# Patient Record
Sex: FEMALE | Race: WHITE | HISPANIC OR LATINO | Employment: FULL TIME | URBAN - METROPOLITAN AREA
[De-identification: names, ages, dates, MRNs, and addresses within clinical notes are randomized per-mention and may not be internally consistent; named-entity substitution may affect disease eponyms.]

---

## 2020-06-29 NOTE — H&P
DATE OF SCHEDULED SURGERY:  2020    HISTORY OF PRESENT ILLNESS:  This is a 49-year-old  4, para 4-0-0-4,   here for preop appointment.  Patient scheduled for total laparoscopic   hysterectomy with bilateral salpingectomy.  Patient has history of heavy   periods going on for years.  Has tried IUD and birth control pills, has not   worked and here requesting definitive relief from bleeding.  Patient is not   currently sexually active.    OBSTETRIC HISTORY:   4, para 4, 4 living children, all normal vaginal   delivery.    PAST MEDICAL HISTORY:  Denies high blood pressure, diabetes or thyroid issues.    MEDICATIONS:  She is not taking any medications.    PAST SURGICAL HISTORY:  No prior surgeries.    SOCIAL HISTORY:  Denies smoking or alcohol use.    FAMILY HISTORY:  Nothing contributory.    REVIEW OF SYSTEMS:  Patient is alert and oriented.  Denies chest pain,   palpitation, or shortness of breath.  Appetite is good.  Regular bowel and   bladder habits.  She is currently taking iron pills.    PHYSICAL EXAMINATION:  VITAL SIGNS:  Stable.  See EMR for current vitals.  Patient is 5 feet 2 inches   tall and weighs 155 pounds.  GENERAL:  Patient is awake, alert, well developed, well nourished and well   groomed.  RESPIRATORY:  Patient is relaxed and breathes without effort.  LUNGS:  Clear to auscultate bilaterally.  No crackles, wheezes, rhonchi or   stridor.  CARDIOVASCULAR:  Rate is normal, rhythm is regular.  S1, S2 are normal.  No   murmurs, no gallops.  ABDOMEN:  Soft, nontender.  No guarding or rigidity.  Bowel sounds are normal.    No palpable masses, no hepatosplenomegaly, no costovertebral angle   tenderness.    IMPRESSION:  A 49-year-old  4, para 4-0-0-4, with abnormal uterine   bleeding.  Ultrasound suggestive of small fibroids.  Endometrial biopsy was   benign, proliferative with polypoid type of endometrial lining.  Patient has   tried medical management and has failed and is desirous  of definitive therapy   for which patient is scheduled for total laparoscopic hysterectomy with   bilateral salpingectomy, possible laparotomy, possible cystoscopy was   discussed.  Preoperative instructions were given.  Operative procedure   discussed in detail.  Risk inclusive of but not limited to infection, injury   and bleeding was explained.  Postoperative instructions were also given.    Patient understands and wants to go ahead with the procedure.   was   ___.             ____________________________________     Alice R. Apgar, PA-C ARA / MIO    DD:  2020 10:58:33  DT:  2020 11:25:08    D#:  [id number]  Job#:  [id number]

## 2020-06-30 NOTE — H&P
DATE OF SCHEDULED SURGERY:  2020    HISTORY OF PRESENT ILLNESS:  This is a 49-year-old  4, para 4-0-0-4,   here for preop appointment.  Patient scheduled for total laparoscopic   hysterectomy with bilateral salpingectomy.  Patient has history of heavy   periods going on for years.  Has tried IUD and birth control pills, has not   worked and here requesting definitive relief from bleeding.  Patient is not   currently sexually active.    OBSTETRIC HISTORY:   4, para 4, 4 living children, all normal vaginal   delivery.    PAST MEDICAL HISTORY:  Denies high blood pressure, diabetes or thyroid issues.    MEDICATIONS:  She is not taking any medications.    PAST SURGICAL HISTORY:  No prior surgeries.    SOCIAL HISTORY:  Denies smoking or alcohol use.    FAMILY HISTORY:  Nothing contributory.    REVIEW OF SYSTEMS:  Patient is alert and oriented.  Denies chest pain,   palpitation, or shortness of breath.  Appetite is good.  Regular bowel and   bladder habits.  She is currently taking iron pills.    PHYSICAL EXAMINATION:  VITAL SIGNS:  Stable.  See EMR for current vitals.  Patient is 5 feet 2 inches   tall and weighs 155 pounds.  GENERAL:  Patient is awake, alert, well developed, well nourished and well   groomed.  RESPIRATORY:  Patient is relaxed and breathes without effort.  LUNGS:  Clear to auscultate bilaterally.  No crackles, wheezes, rhonchi or   stridor.  CARDIOVASCULAR:  Rate is normal, rhythm is regular.  S1, S2 are normal.  No   murmurs, no gallops.  ABDOMEN:  Soft, nontender.  No guarding or rigidity.  Bowel sounds are normal.    No palpable masses, no hepatosplenomegaly, no costovertebral angle   tenderness.    IMPRESSION:  A 49-year-old  4, para 4-0-0-4, with abnormal uterine   bleeding.  Ultrasound suggestive of small fibroids.  Endometrial biopsy was   benign, proliferative with polypoid type of endometrial lining.  Patient has   tried medical management and has failed and is desirous  of definitive therapy   for which patient is scheduled for total laparoscopic hysterectomy with   bilateral salpingectomy, possible laparotomy, possible cystoscopy was   discussed.  Preoperative instructions were given.  Operative procedure   discussed in detail.  Risk inclusive of but not limited to infection, injury   and bleeding was explained.  Postoperative instructions were also given.    Patient understands and wants to go ahead with the procedure.   was   ___.             ____________________________________     MD ARIANNA Jamil / MIO    DD:  06/29/2020 10:58:33  DT:  06/29/2020 11:25:08    D#:  [id number]  Job#:  [id number]

## 2020-07-10 ENCOUNTER — OFFICE VISIT (OUTPATIENT)
Dept: ADMISSIONS | Facility: MEDICAL CENTER | Age: 50
End: 2020-07-10
Attending: OBSTETRICS & GYNECOLOGY
Payer: COMMERCIAL

## 2020-07-10 DIAGNOSIS — Z01.812 PRE-OPERATIVE LABORATORY EXAMINATION: ICD-10-CM

## 2020-07-10 DIAGNOSIS — Z01.810 PRE-OPERATIVE CARDIOVASCULAR EXAMINATION: ICD-10-CM

## 2020-07-10 LAB
ANION GAP SERPL CALC-SCNC: 9 MMOL/L (ref 7–16)
APPEARANCE UR: CLEAR
BASOPHILS # BLD AUTO: 0.7 % (ref 0–1.8)
BASOPHILS # BLD: 0.04 K/UL (ref 0–0.12)
BILIRUB UR QL STRIP.AUTO: NEGATIVE
BUN SERPL-MCNC: 9 MG/DL (ref 8–22)
CALCIUM SERPL-MCNC: 8.6 MG/DL (ref 8.5–10.5)
CHLORIDE SERPL-SCNC: 105 MMOL/L (ref 96–112)
CO2 SERPL-SCNC: 23 MMOL/L (ref 20–33)
COLOR UR: YELLOW
COVID ORDER STATUS COVID19: NORMAL
CREAT SERPL-MCNC: 0.49 MG/DL (ref 0.5–1.4)
EKG IMPRESSION: NORMAL
EOSINOPHIL # BLD AUTO: 0.08 K/UL (ref 0–0.51)
EOSINOPHIL NFR BLD: 1.3 % (ref 0–6.9)
ERYTHROCYTE [DISTWIDTH] IN BLOOD BY AUTOMATED COUNT: 40 FL (ref 35.9–50)
GLUCOSE SERPL-MCNC: 88 MG/DL (ref 65–99)
GLUCOSE UR STRIP.AUTO-MCNC: NEGATIVE MG/DL
HCT VFR BLD AUTO: 40.9 % (ref 37–47)
HGB BLD-MCNC: 13.8 G/DL (ref 12–16)
IMM GRANULOCYTES # BLD AUTO: 0.03 K/UL (ref 0–0.11)
IMM GRANULOCYTES NFR BLD AUTO: 0.5 % (ref 0–0.9)
KETONES UR STRIP.AUTO-MCNC: NEGATIVE MG/DL
LEUKOCYTE ESTERASE UR QL STRIP.AUTO: NEGATIVE
LYMPHOCYTES # BLD AUTO: 2.02 K/UL (ref 1–4.8)
LYMPHOCYTES NFR BLD: 33.5 % (ref 22–41)
MCH RBC QN AUTO: 29.9 PG (ref 27–33)
MCHC RBC AUTO-ENTMCNC: 33.7 G/DL (ref 33.6–35)
MCV RBC AUTO: 88.5 FL (ref 81.4–97.8)
MICRO URNS: NORMAL
MONOCYTES # BLD AUTO: 0.35 K/UL (ref 0–0.85)
MONOCYTES NFR BLD AUTO: 5.8 % (ref 0–13.4)
NEUTROPHILS # BLD AUTO: 3.51 K/UL (ref 2–7.15)
NEUTROPHILS NFR BLD: 58.2 % (ref 44–72)
NITRITE UR QL STRIP.AUTO: NEGATIVE
NRBC # BLD AUTO: 0 K/UL
NRBC BLD-RTO: 0 /100 WBC
PH UR STRIP.AUTO: 8 [PH] (ref 5–8)
PLATELET # BLD AUTO: 283 K/UL (ref 164–446)
PMV BLD AUTO: 9.4 FL (ref 9–12.9)
POTASSIUM SERPL-SCNC: 4 MMOL/L (ref 3.6–5.5)
PROT UR QL STRIP: NEGATIVE MG/DL
RBC # BLD AUTO: 4.62 M/UL (ref 4.2–5.4)
RBC UR QL AUTO: NEGATIVE
SODIUM SERPL-SCNC: 137 MMOL/L (ref 135–145)
SP GR UR STRIP.AUTO: 1.01
UROBILINOGEN UR STRIP.AUTO-MCNC: 0.2 MG/DL
WBC # BLD AUTO: 6 K/UL (ref 4.8–10.8)

## 2020-07-10 PROCEDURE — U0003 INFECTIOUS AGENT DETECTION BY NUCLEIC ACID (DNA OR RNA); SEVERE ACUTE RESPIRATORY SYNDROME CORONAVIRUS 2 (SARS-COV-2) (CORONAVIRUS DISEASE [COVID-19]), AMPLIFIED PROBE TECHNIQUE, MAKING USE OF HIGH THROUGHPUT TECHNOLOGIES AS DESCRIBED BY CMS-2020-01-R: HCPCS

## 2020-07-10 PROCEDURE — 81003 URINALYSIS AUTO W/O SCOPE: CPT

## 2020-07-10 PROCEDURE — 93010 ELECTROCARDIOGRAM REPORT: CPT | Performed by: INTERNAL MEDICINE

## 2020-07-10 PROCEDURE — 36415 COLL VENOUS BLD VENIPUNCTURE: CPT

## 2020-07-10 PROCEDURE — 93005 ELECTROCARDIOGRAM TRACING: CPT

## 2020-07-10 PROCEDURE — 80048 BASIC METABOLIC PNL TOTAL CA: CPT

## 2020-07-10 PROCEDURE — 85025 COMPLETE CBC W/AUTO DIFF WBC: CPT

## 2020-07-10 RX ORDER — ASCORBIC ACID 500 MG
500 TABLET ORAL DAILY
COMMUNITY

## 2020-07-10 SDOH — HEALTH STABILITY: MENTAL HEALTH: HOW OFTEN DO YOU HAVE A DRINK CONTAINING ALCOHOL?: NEVER

## 2020-07-11 LAB
SARS-COV-2 RNA RESP QL NAA+PROBE: NOTDETECTED
SPECIMEN SOURCE: NORMAL

## 2020-07-14 ENCOUNTER — HOSPITAL ENCOUNTER (OUTPATIENT)
Facility: MEDICAL CENTER | Age: 50
End: 2020-07-14
Attending: OBSTETRICS & GYNECOLOGY | Admitting: OBSTETRICS & GYNECOLOGY
Payer: COMMERCIAL

## 2020-07-14 ENCOUNTER — ANESTHESIA (OUTPATIENT)
Dept: SURGERY | Facility: MEDICAL CENTER | Age: 50
End: 2020-07-14
Payer: COMMERCIAL

## 2020-07-14 ENCOUNTER — ANESTHESIA EVENT (OUTPATIENT)
Dept: SURGERY | Facility: MEDICAL CENTER | Age: 50
End: 2020-07-14
Payer: COMMERCIAL

## 2020-07-14 VITALS
HEART RATE: 81 BPM | RESPIRATION RATE: 12 BRPM | DIASTOLIC BLOOD PRESSURE: 64 MMHG | WEIGHT: 154.1 LBS | OXYGEN SATURATION: 96 % | BODY MASS INDEX: 29.09 KG/M2 | HEIGHT: 61 IN | TEMPERATURE: 97.4 F | SYSTOLIC BLOOD PRESSURE: 104 MMHG

## 2020-07-14 DIAGNOSIS — G89.18 POST-OP PAIN: ICD-10-CM

## 2020-07-14 LAB — HCG UR QL: NEGATIVE

## 2020-07-14 PROCEDURE — 500800 HCHG LAPAROSCOPIC J/L HOOK: Performed by: OBSTETRICS & GYNECOLOGY

## 2020-07-14 PROCEDURE — 500868 HCHG NEEDLE, SURGI(VARES): Performed by: OBSTETRICS & GYNECOLOGY

## 2020-07-14 PROCEDURE — 700111 HCHG RX REV CODE 636 W/ 250 OVERRIDE (IP): Performed by: ANESTHESIOLOGY

## 2020-07-14 PROCEDURE — 160041 HCHG SURGERY MINUTES - EA ADDL 1 MIN LEVEL 4: Performed by: OBSTETRICS & GYNECOLOGY

## 2020-07-14 PROCEDURE — 160009 HCHG ANES TIME/MIN: Performed by: OBSTETRICS & GYNECOLOGY

## 2020-07-14 PROCEDURE — A9270 NON-COVERED ITEM OR SERVICE: HCPCS

## 2020-07-14 PROCEDURE — A6404 STERILE GAUZE > 48 SQ IN: HCPCS | Performed by: OBSTETRICS & GYNECOLOGY

## 2020-07-14 PROCEDURE — 700102 HCHG RX REV CODE 250 W/ 637 OVERRIDE(OP)

## 2020-07-14 PROCEDURE — 501330 HCHG SET, CYSTO IRRIG TUBING: Performed by: OBSTETRICS & GYNECOLOGY

## 2020-07-14 PROCEDURE — A6402 STERILE GAUZE <= 16 SQ IN: HCPCS | Performed by: OBSTETRICS & GYNECOLOGY

## 2020-07-14 PROCEDURE — 81025 URINE PREGNANCY TEST: CPT

## 2020-07-14 PROCEDURE — 500886 HCHG PACK, LAPAROSCOPY: Performed by: OBSTETRICS & GYNECOLOGY

## 2020-07-14 PROCEDURE — 160002 HCHG RECOVERY MINUTES (STAT): Performed by: OBSTETRICS & GYNECOLOGY

## 2020-07-14 PROCEDURE — 700101 HCHG RX REV CODE 250: Performed by: OBSTETRICS & GYNECOLOGY

## 2020-07-14 PROCEDURE — 160029 HCHG SURGERY MINUTES - 1ST 30 MINS LEVEL 4: Performed by: OBSTETRICS & GYNECOLOGY

## 2020-07-14 PROCEDURE — A9270 NON-COVERED ITEM OR SERVICE: HCPCS | Performed by: ANESTHESIOLOGY

## 2020-07-14 PROCEDURE — 501411 HCHG SPONGE, BABY LAP W/O RINGS: Performed by: OBSTETRICS & GYNECOLOGY

## 2020-07-14 PROCEDURE — 160046 HCHG PACU - 1ST 60 MINS PHASE II: Performed by: OBSTETRICS & GYNECOLOGY

## 2020-07-14 PROCEDURE — 700101 HCHG RX REV CODE 250: Performed by: ANESTHESIOLOGY

## 2020-07-14 PROCEDURE — A4338 INDWELLING CATHETER LATEX: HCPCS | Performed by: OBSTETRICS & GYNECOLOGY

## 2020-07-14 PROCEDURE — 700102 HCHG RX REV CODE 250 W/ 637 OVERRIDE(OP): Performed by: ANESTHESIOLOGY

## 2020-07-14 PROCEDURE — 502703 HCHG DEVICE, LIGASURE V SEALER: Performed by: OBSTETRICS & GYNECOLOGY

## 2020-07-14 PROCEDURE — 160048 HCHG OR STATISTICAL LEVEL 1-5: Performed by: OBSTETRICS & GYNECOLOGY

## 2020-07-14 PROCEDURE — 501838 HCHG SUTURE GENERAL: Performed by: OBSTETRICS & GYNECOLOGY

## 2020-07-14 PROCEDURE — 160025 RECOVERY II MINUTES (STATS): Performed by: OBSTETRICS & GYNECOLOGY

## 2020-07-14 PROCEDURE — 700105 HCHG RX REV CODE 258: Performed by: OBSTETRICS & GYNECOLOGY

## 2020-07-14 PROCEDURE — 502704 HCHG DEVICE, LIGASURE IMPACT: Performed by: OBSTETRICS & GYNECOLOGY

## 2020-07-14 PROCEDURE — 501582 HCHG TROCAR, THRD BLADED: Performed by: OBSTETRICS & GYNECOLOGY

## 2020-07-14 PROCEDURE — 160036 HCHG PACU - EA ADDL 30 MINS PHASE I: Performed by: OBSTETRICS & GYNECOLOGY

## 2020-07-14 PROCEDURE — 160035 HCHG PACU - 1ST 60 MINS PHASE I: Performed by: OBSTETRICS & GYNECOLOGY

## 2020-07-14 PROCEDURE — 88307 TISSUE EXAM BY PATHOLOGIST: CPT

## 2020-07-14 RX ORDER — MEPERIDINE HYDROCHLORIDE 25 MG/ML
6.25 INJECTION INTRAMUSCULAR; INTRAVENOUS; SUBCUTANEOUS
Status: DISCONTINUED | OUTPATIENT
Start: 2020-07-14 | End: 2020-07-14 | Stop reason: HOSPADM

## 2020-07-14 RX ORDER — METOPROLOL TARTRATE 1 MG/ML
1 INJECTION, SOLUTION INTRAVENOUS
Status: DISCONTINUED | OUTPATIENT
Start: 2020-07-14 | End: 2020-07-14 | Stop reason: HOSPADM

## 2020-07-14 RX ORDER — VASOPRESSIN 20 U/ML
INJECTION PARENTERAL
Status: DISCONTINUED
Start: 2020-07-14 | End: 2020-07-14 | Stop reason: HOSPADM

## 2020-07-14 RX ORDER — CEFAZOLIN SODIUM 1 G/3ML
INJECTION, POWDER, FOR SOLUTION INTRAMUSCULAR; INTRAVENOUS PRN
Status: DISCONTINUED | OUTPATIENT
Start: 2020-07-14 | End: 2020-07-14 | Stop reason: SURG

## 2020-07-14 RX ORDER — SODIUM CHLORIDE, SODIUM LACTATE, POTASSIUM CHLORIDE, CALCIUM CHLORIDE 600; 310; 30; 20 MG/100ML; MG/100ML; MG/100ML; MG/100ML
INJECTION, SOLUTION INTRAVENOUS CONTINUOUS
Status: DISCONTINUED | OUTPATIENT
Start: 2020-07-14 | End: 2020-07-14 | Stop reason: HOSPADM

## 2020-07-14 RX ORDER — IBUPROFEN 600 MG/1
600 TABLET ORAL EVERY 6 HOURS PRN
Qty: 30 TAB | Refills: 2 | Status: SHIPPED | OUTPATIENT
Start: 2020-07-14 | End: 2020-08-13

## 2020-07-14 RX ORDER — DIPHENHYDRAMINE HYDROCHLORIDE 50 MG/ML
12.5 INJECTION INTRAMUSCULAR; INTRAVENOUS
Status: DISCONTINUED | OUTPATIENT
Start: 2020-07-14 | End: 2020-07-14 | Stop reason: HOSPADM

## 2020-07-14 RX ORDER — OXYCODONE HYDROCHLORIDE AND ACETAMINOPHEN 5; 325 MG/1; MG/1
1 TABLET ORAL EVERY 4 HOURS PRN
Qty: 20 TAB | Refills: 0 | Status: SHIPPED | OUTPATIENT
Start: 2020-07-14 | End: 2020-07-21

## 2020-07-14 RX ORDER — LABETALOL HYDROCHLORIDE 5 MG/ML
INJECTION, SOLUTION INTRAVENOUS PRN
Status: DISCONTINUED | OUTPATIENT
Start: 2020-07-14 | End: 2020-07-14 | Stop reason: SURG

## 2020-07-14 RX ORDER — ROCURONIUM BROMIDE 10 MG/ML
INJECTION, SOLUTION INTRAVENOUS PRN
Status: DISCONTINUED | OUTPATIENT
Start: 2020-07-14 | End: 2020-07-14 | Stop reason: SURG

## 2020-07-14 RX ORDER — KETOROLAC TROMETHAMINE 30 MG/ML
INJECTION, SOLUTION INTRAMUSCULAR; INTRAVENOUS PRN
Status: DISCONTINUED | OUTPATIENT
Start: 2020-07-14 | End: 2020-07-14 | Stop reason: SURG

## 2020-07-14 RX ORDER — HYDRALAZINE HYDROCHLORIDE 20 MG/ML
5 INJECTION INTRAMUSCULAR; INTRAVENOUS
Status: DISCONTINUED | OUTPATIENT
Start: 2020-07-14 | End: 2020-07-14 | Stop reason: HOSPADM

## 2020-07-14 RX ORDER — ONDANSETRON 2 MG/ML
INJECTION INTRAMUSCULAR; INTRAVENOUS PRN
Status: DISCONTINUED | OUTPATIENT
Start: 2020-07-14 | End: 2020-07-14 | Stop reason: SURG

## 2020-07-14 RX ORDER — DEXAMETHASONE SODIUM PHOSPHATE 4 MG/ML
INJECTION, SOLUTION INTRA-ARTICULAR; INTRALESIONAL; INTRAMUSCULAR; INTRAVENOUS; SOFT TISSUE PRN
Status: DISCONTINUED | OUTPATIENT
Start: 2020-07-14 | End: 2020-07-14 | Stop reason: SURG

## 2020-07-14 RX ORDER — MIDAZOLAM HYDROCHLORIDE 1 MG/ML
INJECTION INTRAMUSCULAR; INTRAVENOUS PRN
Status: DISCONTINUED | OUTPATIENT
Start: 2020-07-14 | End: 2020-07-14 | Stop reason: SURG

## 2020-07-14 RX ORDER — OXYCODONE HCL 5 MG/5 ML
10 SOLUTION, ORAL ORAL
Status: COMPLETED | OUTPATIENT
Start: 2020-07-14 | End: 2020-07-14

## 2020-07-14 RX ORDER — BUPIVACAINE HYDROCHLORIDE AND EPINEPHRINE 2.5; 5 MG/ML; UG/ML
INJECTION, SOLUTION EPIDURAL; INFILTRATION; INTRACAUDAL; PERINEURAL
Status: DISCONTINUED | OUTPATIENT
Start: 2020-07-14 | End: 2020-07-14 | Stop reason: HOSPADM

## 2020-07-14 RX ORDER — HALOPERIDOL 5 MG/ML
1 INJECTION INTRAMUSCULAR
Status: DISCONTINUED | OUTPATIENT
Start: 2020-07-14 | End: 2020-07-14 | Stop reason: HOSPADM

## 2020-07-14 RX ORDER — ISOSULFAN BLUE 50 MG/5ML
INJECTION, SOLUTION SUBCUTANEOUS
Status: DISCONTINUED
Start: 2020-07-14 | End: 2020-07-14 | Stop reason: HOSPADM

## 2020-07-14 RX ORDER — HYDROMORPHONE HYDROCHLORIDE 2 MG/ML
INJECTION, SOLUTION INTRAMUSCULAR; INTRAVENOUS; SUBCUTANEOUS PRN
Status: DISCONTINUED | OUTPATIENT
Start: 2020-07-14 | End: 2020-07-14 | Stop reason: SURG

## 2020-07-14 RX ORDER — HYDROMORPHONE HYDROCHLORIDE 1 MG/ML
0.4 INJECTION, SOLUTION INTRAMUSCULAR; INTRAVENOUS; SUBCUTANEOUS
Status: DISCONTINUED | OUTPATIENT
Start: 2020-07-14 | End: 2020-07-14 | Stop reason: HOSPADM

## 2020-07-14 RX ORDER — ONDANSETRON 2 MG/ML
4 INJECTION INTRAMUSCULAR; INTRAVENOUS
Status: COMPLETED | OUTPATIENT
Start: 2020-07-14 | End: 2020-07-14

## 2020-07-14 RX ORDER — OXYCODONE HCL 5 MG/5 ML
5 SOLUTION, ORAL ORAL
Status: COMPLETED | OUTPATIENT
Start: 2020-07-14 | End: 2020-07-14

## 2020-07-14 RX ORDER — HYDROMORPHONE HYDROCHLORIDE 1 MG/ML
0.2 INJECTION, SOLUTION INTRAMUSCULAR; INTRAVENOUS; SUBCUTANEOUS
Status: DISCONTINUED | OUTPATIENT
Start: 2020-07-14 | End: 2020-07-14 | Stop reason: HOSPADM

## 2020-07-14 RX ORDER — HYDROMORPHONE HYDROCHLORIDE 1 MG/ML
0.6 INJECTION, SOLUTION INTRAMUSCULAR; INTRAVENOUS; SUBCUTANEOUS
Status: DISCONTINUED | OUTPATIENT
Start: 2020-07-14 | End: 2020-07-14 | Stop reason: HOSPADM

## 2020-07-14 RX ADMIN — ROCURONIUM BROMIDE 50 MG: 10 INJECTION, SOLUTION INTRAVENOUS at 13:35

## 2020-07-14 RX ADMIN — FLUORESCEIN SODIUM 0.5 ML: 100 INJECTION INTRAVENOUS at 15:22

## 2020-07-14 RX ADMIN — KETOROLAC TROMETHAMINE 30 MG: 30 INJECTION, SOLUTION INTRAMUSCULAR at 15:37

## 2020-07-14 RX ADMIN — SODIUM CHLORIDE, POTASSIUM CHLORIDE, SODIUM LACTATE AND CALCIUM CHLORIDE: 600; 310; 30; 20 INJECTION, SOLUTION INTRAVENOUS at 12:40

## 2020-07-14 RX ADMIN — ROCURONIUM BROMIDE 20 MG: 10 INJECTION, SOLUTION INTRAVENOUS at 14:29

## 2020-07-14 RX ADMIN — DEXAMETHASONE SODIUM PHOSPHATE 8 MG: 4 INJECTION, SOLUTION INTRA-ARTICULAR; INTRALESIONAL; INTRAMUSCULAR; INTRAVENOUS; SOFT TISSUE at 14:44

## 2020-07-14 RX ADMIN — HYDROMORPHONE HYDROCHLORIDE 0.5 MG: 2 INJECTION, SOLUTION INTRAMUSCULAR; INTRAVENOUS; SUBCUTANEOUS at 15:33

## 2020-07-14 RX ADMIN — LABETALOL HYDROCHLORIDE 5 MG: 5 INJECTION, SOLUTION INTRAVENOUS at 14:54

## 2020-07-14 RX ADMIN — ONDANSETRON 4 MG: 2 INJECTION INTRAMUSCULAR; INTRAVENOUS at 16:29

## 2020-07-14 RX ADMIN — ROCURONIUM BROMIDE 20 MG: 10 INJECTION, SOLUTION INTRAVENOUS at 14:47

## 2020-07-14 RX ADMIN — FENTANYL CITRATE 100 MCG: 50 INJECTION INTRAMUSCULAR; INTRAVENOUS at 13:32

## 2020-07-14 RX ADMIN — HYDROMORPHONE HYDROCHLORIDE 0.5 MG: 2 INJECTION, SOLUTION INTRAMUSCULAR; INTRAVENOUS; SUBCUTANEOUS at 15:37

## 2020-07-14 RX ADMIN — FENTANYL CITRATE 25 MCG: 50 INJECTION INTRAMUSCULAR; INTRAVENOUS at 18:55

## 2020-07-14 RX ADMIN — SUGAMMADEX 200 MG: 100 INJECTION, SOLUTION INTRAVENOUS at 15:35

## 2020-07-14 RX ADMIN — OXYCODONE HYDROCHLORIDE 10 MG: 5 SOLUTION ORAL at 16:29

## 2020-07-14 RX ADMIN — PROPOFOL 200 MG: 10 INJECTION, EMULSION INTRAVENOUS at 13:34

## 2020-07-14 RX ADMIN — FENTANYL CITRATE 50 MCG: 50 INJECTION INTRAMUSCULAR; INTRAVENOUS at 14:47

## 2020-07-14 RX ADMIN — ONDANSETRON 4 MG: 2 INJECTION INTRAMUSCULAR; INTRAVENOUS at 15:26

## 2020-07-14 RX ADMIN — MIDAZOLAM HYDROCHLORIDE 2 MG: 1 INJECTION, SOLUTION INTRAMUSCULAR; INTRAVENOUS at 13:30

## 2020-07-14 RX ADMIN — POVIDONE-IODINE: 10 SOLUTION TOPICAL at 12:45

## 2020-07-14 RX ADMIN — FENTANYL CITRATE 50 MCG: 50 INJECTION INTRAMUSCULAR; INTRAVENOUS at 14:02

## 2020-07-14 RX ADMIN — CEFAZOLIN 2 G: 330 INJECTION, POWDER, FOR SOLUTION INTRAMUSCULAR; INTRAVENOUS at 13:43

## 2020-07-14 RX ADMIN — FENTANYL CITRATE 50 MCG: 50 INJECTION INTRAMUSCULAR; INTRAVENOUS at 14:23

## 2020-07-14 ASSESSMENT — PAIN SCALES - GENERAL: PAIN_LEVEL: 0

## 2020-07-14 NOTE — ANESTHESIA TIME REPORT
Anesthesia Start and Stop Event Times     Date Time Event    7/14/2020 1319 Ready for Procedure     1332 Anesthesia Start     1552 Anesthesia Stop        Responsible Staff  07/14/20    Name Role Begin End    Humberto Márquez M.D. Anesth 1332 1503    Kervin Goodwin M.D. Anesth 1503 1552        Preop Diagnosis (Free Text):  Pre-op Diagnosis     ABNORMAL UTERINE BLEEDING, UTERINE FIBROIDS        Preop Diagnosis (Codes):    Post op Diagnosis  Abnormal uterine bleeding      Premium Reason  A. 3PM - 7AM    Comments:

## 2020-07-14 NOTE — OR SURGEON
Immediate Post OP Note    PreOp Diagnosis: AUB      PostOp Diagnosis: AUb.    Procedure(s):  HYSTERECTOMY, LAPAROSCOPIC - Wound Class: Clean  SALPINGO-OOPHORECTOMY - Wound Class: Clean  CYSTOSCOPY- POSSIBLE - Wound Class: Clean Contaminated    Surgeon(s):  HANANE Jamil M.D.    Anesthesiologist/Type of Anesthesia:  Anesthesiologist: Kervin Goodwin M.D.; Humberto Márquez M.D./General    Surgical Staff:  Circulator: Bea Suárez R.N.  Relief Circulator: Alyson Rollins R.N.  Relief Scrub: Libby Kendall  Scrub Person: Paula Simons; Alisha Campuzano [place]: Fawn Palomares R.N.    Specimens removed if any:  ID Type Source Tests Collected by Time Destination   A : Uterus, cervix, bilateral fallopian tubes Tissue Uterus PATHOLOGY SPECIMEN Juventino Kothari M.D. 7/14/2020  2:12 PM        Estimated Blood Loss: 200ml.    Findings: enlarged uterus.both tubes and ovaries normal.    Complications: none.        7/14/2020 4:06 PM Juventino Kothari M.D.

## 2020-07-14 NOTE — ANESTHESIA PROCEDURE NOTES
Airway    Date/Time: 7/14/2020 1:35 PM  Performed by: Humberto Máruqez M.D.  Authorized by: Humberto Márquez M.D.     Location:  OR  Urgency:  Elective  Indications for Airway Management:  Anesthesia      Spontaneous Ventilation: absent    Sedation Level:  Deep  Preoxygenated: Yes    Patient Position:  Sniffing  Final Airway Type:  Endotracheal airway  Final Endotracheal Airway:  ETT  Cuffed: Yes    Technique Used for Successful ETT Placement:  Direct laryngoscopy    Insertion Site:  Oral  Blade Type:  Nilam  Laryngoscope Blade/Videolaryngoscope Blade Size:  3  ETT Size (mm):  7.0  Measured from:  Teeth  ETT to Teeth (cm):  23  Placement Verified by: auscultation and capnometry    Cormack-Lehane Classification:  Grade I - full view of glottis  Number of Attempts at Approach:  1

## 2020-07-15 LAB — PATHOLOGY CONSULT NOTE: NORMAL

## 2020-07-15 NOTE — ANESTHESIA POSTPROCEDURE EVALUATION
Patient: Bushra Morfin    Procedure Summary     Date:  07/14/20 Room / Location:  [facility] ROOM 24 / SURGERY SAME DAY [facility]    Anesthesia Start:  1332 Anesthesia Stop:  1552    Procedures:       HYSTERECTOMY, LAPAROSCOPIC (N/A Pelvis)      SALPINGO-OOPHORECTOMY (Bilateral Pelvis)      CYSTOSCOPY- POSSIBLE (Bilateral Bladder) Diagnosis:  (ABNORMAL UTERINE BLEEDING, UTERINE FIBROIDS)    Surgeon:  Juventino Kothari M.D. Responsible Provider:  Kervin Goodwin M.D.    Anesthesia Type:  general ASA Status:  1          Final Anesthesia Type: general  Last vitals  BP   Blood Pressure: (!) 93/55    Temp   36.3 °C (97.4 °F)    Pulse   Pulse: 70   Resp   12    SpO2   92 %      Anesthesia Post Evaluation    Patient location during evaluation: PACU  Patient participation: complete - patient participated  Level of consciousness: awake and alert  Pain score: 0    Airway patency: patent  Anesthetic complications: no  Cardiovascular status: hemodynamically stable  Respiratory status: acceptable  Hydration status: euvolemic    PONV: none           Nurse Pain Score: 0 (NPRS)

## 2020-07-15 NOTE — DISCHARGE INSTRUCTIONS
Instrucciones Para [place]  (Home Care Instructions)    ACTIVIDAD: Descanse y tome todo con mucha calma las primeras 24 horas después de roe cirugía.  Phyllis persona adulta responsable debe permanecer con usted mahin lucretia periodo de tiempo.  Es normal sentirse sonoliento o sonolienta mahin esas primeras horas.  Le recomendamos que no ezra nada que requiera equilibrio, jason decisiones a mucha coordinación de roe parte.    NO EZRA ESTO PURANTE LAS PRIMERAS 24 HORAS:   Manejar o conducir algún vehiculo, operar maquinarias o utilizar electrodomesticos.   Beber cerveza o algún otro tipo de bebida alcohólica.   Jason decisiones importantes o firmar documentos legales.    DIETA: Para evitar las nauseas, prosiga despacito con roe dieta a medida que pueda ir tolerándola mejor, evite comidas muy condimentadas o grasosas mahin lucretia primer día.  Vaya agregando comidas más substanciadas a roe dieta a medida que asi lo indique roe médica.  Los bebés pueden beber leche preparada o formula, ásl lori también leche del seno de la madre a medida que vayan teniendo hambre.  SIGA AGREGANDO LIQUIDOS Y COMIDAS CON FIBRA PARA EVITAR ESTREÑIMIENTO.    MEDICAMENTOS/MEDICINAS:  Vuelva a jason aleida medicamentos diarios.  [place] los medicamentos que se le prescribe con un poco de comida.  Si no le prescribe ningún tipo de medicamento, entonces puede jason medicinas para el dolor que no contienen aspirina, si las necesita.  LAS MEDICINAS PARA EL DOLOR PUEDEN ESTREÑIRLE MUCHO.  [place] un suavizante para el excremento o materia fecal (stool softener) o un laxativo lori por ejemplo: senokot, pericolase, o leche de magnesia, si lo necesita.    La prescripción la administro: OXYCODONE, IBUPROFEN.   La ultima sosis de medicina para el dolor fue administrada 4:30 pm    Se debe hacer phyllis consulta medica con el doctor:  [phone] -- Líame para hacer la lucinda.    Usted debe LIAMAR A ROE MEDICO si tiene los siguientes síntomas:   -   Phyllis fiebre más huma de 101  grados Fahrenheit.   -   Un dolor incesante aún con los medicamentos, o nauseas y vómito persistente.   -   Un sangrado excesivo (beverley que traspasa los vendajes o gasas) o algúln tipo de drenaje inesperado que proviene de la henda.     -   Un color irizarry exagerado o hinchazón alrededor del área en donde se le hizo incisión o vani, o un drenaje de pus o con olor june proveniente de la henda.   -    La inhabilidad de orinar o vaciar roe vejiga en 8 horas.   -    Problemas con a respiración o maddison en el pecho.    Usted debe llamar al 911 si se presentan problemas con el dolor al respirar o el pecho.  Si no se puede ponnoer en comunicación con un medica o con el centro de cirugía, usted debe ir a la estación de emergencia (emergency room) más cercana o a un centro de atención de urgencia (urgent care center).  El teléfono del medico es: [phone]    LOS SÍNTOMAS DE UN LEVE RESFRIO SON MUY NORMALES.  ADEMÁS USTED PUEDE LLEGAR A SENTIR MADDISON GENERALES DE MÚSCULOS, IRRITACIÓN EN LA GARGANTA, MADDISON DE FABI Y/O UN POCO DE NAUSEAS.    Sie tiene alguna pregunta, llame a roe médico.  Si roe médico no se encuentra disponible, por favor llame al Centro de Cirugía at [phone].  el Centro está abierto de Lunes a Viernes desde las 7:00 de la manana hasta las 7:00 de la noche.  Usted también puede llamar al [facility]TRO [facility]BRE [place] o HEALTH HOTLINE.  Camila está abierto viente y cuatro horas por pio, siete ugalde por semana, allí podrá hablar con phyllis enfermera.  Llame al [phone], o al número [facility] 6 [phone].    Mi firma a continuación indica que he recibido y entiendco estas instrucciones acera de los cuidados en la casa (Home Care Instructions)    · Florin recibirá phyllis encuesta en la correspondencia en las siguientes semanas y le pedimos que por favor tome un momento para completar eden encuesta y regresaría a hosotros.  Nuestro objetivó es brindarle un cuidado muy jacob y par lo tanto apreciamos  aleida coméntanos.  Muchas leonardo por abdiel escogido el [facility]ug[facility] de [facility].

## 2020-07-15 NOTE — OP REPORT
DATE OF SERVICE:  2020    INDICATION:  The patient is a 49-years old  4, para 4-0-0-4, had heavy   abnormal uterine bleeding, has tried medical management, IUD, birth control   pills that has not worked well for her, so scheduled for total laparoscopic   hysterectomy with bilateral salpingectomy here.    PREOPERATIVE DIAGNOSIS:  Abnormal uterine bleeding.    POSTOPERATIVE DIAGNOSIS:  Abnormal uterine bleeding.    PROCEDURE PERFORMED:  Total laparoscopic hysterectomy with bilateral   salpingectomy, cystoscopy.    ANESTHESIA:  General endotracheal tube.    ANESTHESIOLOGIST:  Humberto Márquez MD    SURGEON:  Juventino Kothari MD    ASSISTANT:  Leeanna Majano MD    SPECIMEN:  Uterus with cervix with both tubes.    FINDINGS:  During laparoscopy, uterus was noted to be upper limits of normal   size with normal-appearing right and left fallopian tubes.  Both ovaries were   normal and assessment of the upper abdomen, liver, gallbladder, spleen,   omentum and peritoneal surfaces of the bowel were all unremarkable in   appearance.    PROCEDURE:  Patient was taken to the operating room where general anesthesia   was induced without difficulty.  Patient was then placed in the dorsal   lithotomy position, examined under anesthesia.  Prepped and draped in a normal   sterile fashion.  Bladder was Camacho catheterized.  Rugby speculum was   placed in the patient's vagina.  Anterior lip of the cervix was grasped with   single tooth tenaculum.  Cervix was tacked at 6 o'clock, 12 o'clock with 0   Vicryl sutures.  Cervix was dilated with Hanks dilator and a VCare apparatus   was passed into the uterus for uterine manipulation during the planned   operative procedure.  Suture was fixed on the colpotomy ring on VCare and   ligated.  Speculum was removed from the vagina.  Attention was then turned to   the patient's abdomen.  After re-gloving, infraumbilical skin infiltrated with   Marcaine with epi.  A 10 mm skin incision  was made in the umbilical fold.    The Veress needle was carefully introduced into the peritoneal cavity at a   45-degree angle while tenting up the abdominal wall.  Intraperitoneal   placement was confirmed by low pressure peritoneal cavity.  Pneumoperitoneum   was obtained with 4 liters of CO2 gas, and a 10 mm trocar with sleeve was   introduced into the abdomen in a controlled fashion.  Placement was confirmed   by laparoscope.  Abdomen was explored with the laparoscope, findings as noted   above.  No injury or bleeding under the port site or entry track noted.    Patient changed to Trendelenburg position.  Remaining ports, 5 mm, right and   left lower quadrants, were placed.  Attention was then directed to the   laparoscopic hysterectomy.  Direction and location of both ureters were   identified from the pelvic brim to the cardinal ligaments, noted to be clear   from the operative field.  The right fallopian tube was held up with Prestige   and the mesosalpinx was exposed.  Using the LigaSure, the mesosalpinx at the   infundibular end was clamped, coagulated and cut.  Dissection was carried out   parallel to the tube.  The tube was excised, brought out through the incision   and sent for pathology.  The uteroovarian ligament was clamped, coagulated and   cut.  The round ligament was clamped, coagulated twice and cut in between in   all the 3 incisions.  Then the anterior leaf of the broad ligament was lifted   up and dissection towards the bladder reflection was done and bladder was   pushed down.  Then, uterine vessels were skeletonized and the uterine vessels   were coagulated and cut.  The uterosacral cardinal ligament complex was   coagulated and cut.  Then, attention was directed towards the left side.  The   left fallopian tube was held with Prestige, and the mesosalpinx was clamped,   coagulated, and cut.  Dissection parallel to the fallopian tube was carried   out.  Then, the segment of the fallopian  tube was excised and brought out   through the incision and sent for pathology.  Uteroovarian ligament was   clamped, coagulated and cut.  Round ligament was clamped and coagulated twice   and cut.  Anterior leaf of the broad ligament was lifted up and dissection   towards the bladder reflection from the opposite side was joined.  Bladder was   well pushed away from the lower uterine segment and upper part of the vaginal   wall.  Uterine vessels were skeletonized slowly.  Difficulty was noted on the   left side where small segments of the broad ligament was clamped, coagulated   and cut.  Bleeders were coagulated in between and cut, and then the   uterosacral cardinal ligament complex was clamped, coagulated and cut.    Attention was directed toward the culdotomy.  Anterior culdotomy was created   with the J hook, which was extended laterally in both the directions, taking   care to coagulate in between due to the very vascular vaginal cuff.  The   bleeders on the lateral angles on the ascending cervical branches were further   coagulated and cut.  Then the remnant of the uterosacral ligament was also   again clamped, coagulated and cut, and the vaginal cuff was excised using the   J hook.  This was joined posteriorly from both the sides and the vaginal cuff   was  from the cervix.  The uterus was brought out through the vaginal   incision.  Pneumoperitoneum was stopped and vaginal cuff was held.  Attention   was directed towards the vaginal portion.  Vaginal cuff was held with Allis.    A mini lap was introduced to keep the bowels away.  On the right angle of the   cuff, a stitch was placed and tied down and another stitch was placed on the   left angle and tied down and the cuff was closed anterior posteriorly in a   running fashion after removing all the instruments.  Hemostasis was attained.    Attention was directed towards the cystoscope.  Camacho was removed.    Cystoscope was introduced.  Bladder was  distended.  Bilateral ureteral jet   flow was visualized on both ends.  All the walls of the bladder wall were   visualized and appeared normal.  Cystoscope was withdrawn.  Camacho was   repositioned.  After re-gloving, attention was directed towards the abdominal   portion again.  Pneumoperitoneum was obtained.  Laparoscope was introduced.    Pelvic cavity and pedicles were copiously irrigated, fluid was suctioned out.    Small bleeding surface was noted on the bladder, which was smeared with   [place] and hemostasis was attained.  Pneumoperitoneum was released and   hemostasis was reaffirmed.  All the instruments were removed.  Umbilical port   fascia was closed with 0 Vicryl and skin of all the 3 ports was closed with   4-0 Monocryl.  Patient withstood the procedure well and was extubated under   stable condition.    ESTIMATED BLOOD LOSS:  200 mL.    Bladder drained 400 mL of clear urine.       ____________________________________     MD ARIANNA Jamil / MIO    DD:  07/14/2020 16:05:21  DT:  07/14/2020 20:25:54    D#:  [id number]  Job#:  [id number]    cc: Leeanna Majano MD

## 2022-10-19 NOTE — OR NURSING
1550 - Pt received to PACU 19 from OR.  VSS, 4L mask, x3 abdominal sites observed covered with band aid dressings.  Abdomen soft, scant drainage on lorena pad.  Bedside report from anesthesiologist and RN.      1630 - Pt medicated for reported nausea and pain to abdomen.  Titrated to room air, VSS.     1700 - Pt with less than 75mL emesis.  States feels better afterwards.  VSS, room air.     1900 - Pt medicated for abdominal pain, brought on when moving. Pt able to get in chair and get dressed. Otherwise, Room air and VSS.  Stable to discharge to Phase II at this time.  Daughter on her way.      2000 - Pt taken down to car.  Daughter picked her up, discharge instructions given to both.  IV and armband removed.  Pt discharged.  Still with some nausea and pain, but tolerable.      ,

## (undated) DEVICE — DRESSING NON-ADHERING 8 X 3 - (50/BX)

## (undated) DEVICE — TROCAR 5X100 BLADED ADVANCE - FIXATION (6/BX)

## (undated) DEVICE — MANIFOLD NEPTUNE 1 PORT (20/PK)

## (undated) DEVICE — SET EXTENSION WITH 2 PORTS (48EA/CA) ***PART #2C8610 IS A SUBSTITUTE*****

## (undated) DEVICE — PAD SANITARY 11IN MAXI IND WRAPPED  (12EA/PK 24PK/CA)

## (undated) DEVICE — SUTURE 0 VICRYL PLUS CT-1 - 36 INCH (36/BX)

## (undated) DEVICE — LIGASURE 5MM BLUNT TIP LONG - 44CM (6EA/PK)

## (undated) DEVICE — KIT ANESTHESIA W/CIRCUIT & 3/LT BAG W/FILTER (20EA/CA)

## (undated) DEVICE — SUTURE GENERAL

## (undated) DEVICE — SET IRRIGATION CYSTOSCOPY TUBE L80 IN (20EA/CA)

## (undated) DEVICE — CATHETER IV 20 GA X 1-1/4 ---SURG.& SDS ONLY--- (50EA/BX)

## (undated) DEVICE — TRAY SRGPRP PVP IOD WT PRP - (20/CA)

## (undated) DEVICE — CANISTER SUCTION 3000ML MECHANICAL FILTER AUTO SHUTOFF MEDI-VAC NONSTERILE LF DISP  (40EA/CA)

## (undated) DEVICE — GLOVE BIOGEL PI INDICATOR SZ 7.0 SURGICAL PF LF - (50/BX 4BX/CA)

## (undated) DEVICE — SET LEADWIRE 5 LEAD BEDSIDE DISPOSABLE ECG (1SET OF 5/EA)

## (undated) DEVICE — TUBE CONNECTING SUCTION - CLEAR PLASTIC STERILE 72 IN (50EA/CA)

## (undated) DEVICE — PACK LAPAROSCOPY - (1/CA)

## (undated) DEVICE — ARMREST CRADLE FOAM - (2PR/PK 12PR/CA)

## (undated) DEVICE — DRESSING TRANSPARENT FILM TEGADERM 2.375 X 2.75"  (100EA/BX)"

## (undated) DEVICE — PAD LAP STERILE 18 X 18 - (5/PK 40PK/CA)

## (undated) DEVICE — UTERINE MANIP V-CARE STANDARD DAVINCI (8EA/CA)

## (undated) DEVICE — DRAPESURG STERI-DRAPE LONG - (10/BX 4BX/CA)

## (undated) DEVICE — SUTURE 4-0 MONOCRYL PLUS PS-2 - 27 INCH (36/BX)

## (undated) DEVICE — GOWN SURGEONS X-LARGE - DISP. (30/CA)

## (undated) DEVICE — PAD OR TABLE DA VINCI 2IN X 20IN X 72IN - (12EA/CA)

## (undated) DEVICE — DRAPE VAGINAL BIB W/ POUCH (10EA/CA)

## (undated) DEVICE — CHLORAPREP 26 ML APPLICATOR - ORANGE TINT(25/CA)

## (undated) DEVICE — LACTATED RINGERS INJ 1000 ML - (14EA/CA 60CA/PF)

## (undated) DEVICE — ELECTRODE DUAL RETURN W/ CORD - (50/PK)

## (undated) DEVICE — CANISTER SUCTION RIGID RED 1500CC (40EA/CA)

## (undated) DEVICE — SPONGE GAUZESTER. 2X2 4-PL - (2/PK 50PK/BX 30BX/CS)

## (undated) DEVICE — GOWN WARMING STANDARD FLEX - (30/CA)

## (undated) DEVICE — TROCAR Z THREAD 11 X 100 - BLADED (6/BX)

## (undated) DEVICE — HEMOSTAT ARISTA PWD 5 GRAM - (5/BX)

## (undated) DEVICE — PAD BABY LAP 4X18 W/O - RINGS PREWASHED 5/PK 40PK/CS

## (undated) DEVICE — DRAPE STRLE REG TOWEL 18X24 - (10/BX 4BX/CA)"

## (undated) DEVICE — KIT  I.V. START (100EA/CA)

## (undated) DEVICE — MASK ANESTHESIA ADULT  - (100/CA)

## (undated) DEVICE — GLOVE BIOGEL INDICATOR SZ 7SURGICAL PF LTX - (50/BX 4BX/CA)

## (undated) DEVICE — APPICATOR HEMOSTAT ARISTA XL - FLEXITIP (10EA/CA)

## (undated) DEVICE — GLOVE SZ 6.5 BIOGEL PI MICRO - PF LF (50PR/BX)

## (undated) DEVICE — NEEDLE INSFL 120MM 14GA VRRS - (20/BX)

## (undated) DEVICE — SPONGE GAUZESTER 4 X 4 4PLY - (128PK/CA)

## (undated) DEVICE — NEPTUNE 4 PORT MANIFOLD - (20/PK)

## (undated) DEVICE — LIGASURE LAPAROSCOPIC 5MM - (6EA/CA)

## (undated) DEVICE — HEAD HOLDER JUNIOR/ADULT

## (undated) DEVICE — SET SUCTION/IRRIGATION WITH DISPOSABLE TIP (6/CA )PART #0250-070-520 IS A SUB

## (undated) DEVICE — ELECTRODE 5MM LHK LAPSCP STERILE DISP- MEGADYNE  (5/CA)

## (undated) DEVICE — PACK MINOR BASIN - (2EA/CA)

## (undated) DEVICE — SLEEVE, VASO, THIGH, MED

## (undated) DEVICE — GLOVE BIOGEL SZ 6.5 SURGICAL PF LTX (50PR/BX 4BX/CA)

## (undated) DEVICE — ANTI-FOG SOLUTION - 60BTL/CA

## (undated) DEVICE — ELECTRODE 850 FOAM ADHESIVE - HYDROGEL RADIOTRNSPRNT (50/PK)

## (undated) DEVICE — SUTURE 0 VICRYL PLUS CT-2 - 27 INCH (36/BX)

## (undated) DEVICE — SODIUM CHL IRRIGATION 0.9% 1000ML (12EA/CA)

## (undated) DEVICE — PROTECTOR ULNA NERVE - (36PR/CA)

## (undated) DEVICE — GLOVE SZ 7 BIOGEL PI MICRO - PF LF (50PR/BX 4BX/CA)

## (undated) DEVICE — SPONGE GAUZE STER 4X4 8-PL - (2/PK 50PK/BX 12BX/CS)

## (undated) DEVICE — TRAY FOLEY CATHETER STATLOCK 16FR SURESTEP  (10EA/CA)

## (undated) DEVICE — TUBING CLEARLINK DUO-VENT - C-FLO (48EA/CA)

## (undated) DEVICE — SUCTION INSTRUMENT YANKAUER BULBOUS TIP W/O VENT (50EA/CA)

## (undated) DEVICE — TUBE E-T HI-LO CUFF 7.0MM (10EA/PK)

## (undated) DEVICE — SENSOR SPO2 NEO LNCS ADHESIVE (20/BX) SEE USER NOTES